# Patient Record
Sex: MALE | Race: WHITE | NOT HISPANIC OR LATINO | URBAN - METROPOLITAN AREA
[De-identification: names, ages, dates, MRNs, and addresses within clinical notes are randomized per-mention and may not be internally consistent; named-entity substitution may affect disease eponyms.]

---

## 2018-09-17 ENCOUNTER — INPATIENT (INPATIENT)
Facility: HOSPITAL | Age: 68
LOS: 0 days | Discharge: LEFT BEFORE DISCHARGE | DRG: 445 | End: 2018-09-18
Attending: STUDENT IN AN ORGANIZED HEALTH CARE EDUCATION/TRAINING PROGRAM | Admitting: STUDENT IN AN ORGANIZED HEALTH CARE EDUCATION/TRAINING PROGRAM
Payer: MEDICARE

## 2018-09-17 VITALS
WEIGHT: 178.13 LBS | TEMPERATURE: 100 F | OXYGEN SATURATION: 98 % | RESPIRATION RATE: 18 BRPM | SYSTOLIC BLOOD PRESSURE: 118 MMHG | HEART RATE: 98 BPM | HEIGHT: 69 IN | DIASTOLIC BLOOD PRESSURE: 67 MMHG

## 2018-09-17 DIAGNOSIS — R50.9 FEVER, UNSPECIFIED: ICD-10-CM

## 2018-09-17 DIAGNOSIS — Z90.49 ACQUIRED ABSENCE OF OTHER SPECIFIED PARTS OF DIGESTIVE TRACT: Chronic | ICD-10-CM

## 2018-09-17 DIAGNOSIS — Z96.651 PRESENCE OF RIGHT ARTIFICIAL KNEE JOINT: Chronic | ICD-10-CM

## 2018-09-17 DIAGNOSIS — K83.0 CHOLANGITIS: ICD-10-CM

## 2018-09-17 DIAGNOSIS — Z98.890 OTHER SPECIFIED POSTPROCEDURAL STATES: Chronic | ICD-10-CM

## 2018-09-17 DIAGNOSIS — Z91.89 OTHER SPECIFIED PERSONAL RISK FACTORS, NOT ELSEWHERE CLASSIFIED: ICD-10-CM

## 2018-09-17 DIAGNOSIS — E87.1 HYPO-OSMOLALITY AND HYPONATREMIA: ICD-10-CM

## 2018-09-17 DIAGNOSIS — I10 ESSENTIAL (PRIMARY) HYPERTENSION: ICD-10-CM

## 2018-09-17 DIAGNOSIS — N17.9 ACUTE KIDNEY FAILURE, UNSPECIFIED: ICD-10-CM

## 2018-09-17 DIAGNOSIS — E78.2 MIXED HYPERLIPIDEMIA: ICD-10-CM

## 2018-09-17 LAB
ALBUMIN SERPL ELPH-MCNC: 4.2 G/DL — SIGNIFICANT CHANGE UP (ref 3.3–5)
ALP SERPL-CCNC: 79 U/L — SIGNIFICANT CHANGE UP (ref 40–120)
ALT FLD-CCNC: 63 U/L — HIGH (ref 10–45)
ANION GAP SERPL CALC-SCNC: 11 MMOL/L — SIGNIFICANT CHANGE UP (ref 5–17)
APPEARANCE UR: CLEAR — SIGNIFICANT CHANGE UP
APTT BLD: 28.5 SEC — SIGNIFICANT CHANGE UP (ref 27.5–37.4)
AST SERPL-CCNC: 53 U/L — HIGH (ref 10–40)
BILIRUB SERPL-MCNC: 0.6 MG/DL — SIGNIFICANT CHANGE UP (ref 0.2–1.2)
BILIRUB UR-MCNC: NEGATIVE — SIGNIFICANT CHANGE UP
BLD GP AB SCN SERPL QL: NEGATIVE — SIGNIFICANT CHANGE UP
BUN SERPL-MCNC: 17 MG/DL — SIGNIFICANT CHANGE UP (ref 7–23)
CALCIUM SERPL-MCNC: 9.5 MG/DL — SIGNIFICANT CHANGE UP (ref 8.4–10.5)
CHLORIDE SERPL-SCNC: 94 MMOL/L — LOW (ref 96–108)
CHLORIDE UR-SCNC: 34 MMOL/L — SIGNIFICANT CHANGE UP
CO2 SERPL-SCNC: 27 MMOL/L — SIGNIFICANT CHANGE UP (ref 22–31)
COLOR SPEC: YELLOW — SIGNIFICANT CHANGE UP
CREAT ?TM UR-MCNC: 44 MG/DL — SIGNIFICANT CHANGE UP
CREAT SERPL-MCNC: 1.31 MG/DL — HIGH (ref 0.5–1.3)
DIFF PNL FLD: NEGATIVE — SIGNIFICANT CHANGE UP
EOSINOPHIL NFR BLD AUTO: 1 % — SIGNIFICANT CHANGE UP (ref 0–6)
GLUCOSE SERPL-MCNC: 110 MG/DL — HIGH (ref 70–99)
GLUCOSE UR QL: NEGATIVE — SIGNIFICANT CHANGE UP
HCT VFR BLD CALC: 44.7 % — SIGNIFICANT CHANGE UP (ref 39–50)
HGB BLD-MCNC: 14.5 G/DL — SIGNIFICANT CHANGE UP (ref 13–17)
INR BLD: 1.15 — SIGNIFICANT CHANGE UP (ref 0.88–1.16)
KETONES UR-MCNC: NEGATIVE — SIGNIFICANT CHANGE UP
LACTATE SERPL-SCNC: 0.9 MMOL/L — SIGNIFICANT CHANGE UP (ref 0.5–2)
LACTATE SERPL-SCNC: 2.6 MMOL/L — HIGH (ref 0.5–2)
LEUKOCYTE ESTERASE UR-ACNC: NEGATIVE — SIGNIFICANT CHANGE UP
LYMPHOCYTES # BLD AUTO: 5 % — LOW (ref 13–44)
MCHC RBC-ENTMCNC: 29.2 PG — SIGNIFICANT CHANGE UP (ref 27–34)
MCHC RBC-ENTMCNC: 32.4 G/DL — SIGNIFICANT CHANGE UP (ref 32–36)
MCV RBC AUTO: 90.1 FL — SIGNIFICANT CHANGE UP (ref 80–100)
MONOCYTES NFR BLD AUTO: 10 % — SIGNIFICANT CHANGE UP (ref 2–14)
NEUTROPHILS NFR BLD AUTO: 83 % — HIGH (ref 43–77)
NITRITE UR-MCNC: NEGATIVE — SIGNIFICANT CHANGE UP
OSMOLALITY UR: 240 MOSMOL/KG — SIGNIFICANT CHANGE UP (ref 100–650)
PH UR: 5.5 — SIGNIFICANT CHANGE UP (ref 5–8)
PLATELET # BLD AUTO: 197 K/UL — SIGNIFICANT CHANGE UP (ref 150–400)
POTASSIUM SERPL-MCNC: 4.6 MMOL/L — SIGNIFICANT CHANGE UP (ref 3.5–5.3)
POTASSIUM SERPL-SCNC: 4.6 MMOL/L — SIGNIFICANT CHANGE UP (ref 3.5–5.3)
POTASSIUM UR-SCNC: 30 MMOL/L — SIGNIFICANT CHANGE UP
PROT ?TM UR-MCNC: 5 MG/DL — SIGNIFICANT CHANGE UP (ref 0–12)
PROT SERPL-MCNC: 7.4 G/DL — SIGNIFICANT CHANGE UP (ref 6–8.3)
PROT UR-MCNC: NEGATIVE MG/DL — SIGNIFICANT CHANGE UP
PROTHROM AB SERPL-ACNC: 12.8 SEC — HIGH (ref 9.8–12.7)
RAPID RVP RESULT: SIGNIFICANT CHANGE UP
RBC # BLD: 4.96 M/UL — SIGNIFICANT CHANGE UP (ref 4.2–5.8)
RBC # FLD: 13.1 % — SIGNIFICANT CHANGE UP (ref 10.3–16.9)
RH IG SCN BLD-IMP: POSITIVE — SIGNIFICANT CHANGE UP
SODIUM SERPL-SCNC: 132 MMOL/L — LOW (ref 135–145)
SODIUM UR-SCNC: 23 MMOL/L — SIGNIFICANT CHANGE UP
SP GR SPEC: 1.01 — SIGNIFICANT CHANGE UP (ref 1–1.03)
UROBILINOGEN FLD QL: 0.2 E.U./DL — SIGNIFICANT CHANGE UP
WBC # BLD: 7 K/UL — SIGNIFICANT CHANGE UP (ref 3.8–10.5)
WBC # FLD AUTO: 7 K/UL — SIGNIFICANT CHANGE UP (ref 3.8–10.5)

## 2018-09-17 PROCEDURE — 99223 1ST HOSP IP/OBS HIGH 75: CPT | Mod: GC,AI

## 2018-09-17 PROCEDURE — 99285 EMERGENCY DEPT VISIT HI MDM: CPT

## 2018-09-17 PROCEDURE — 71046 X-RAY EXAM CHEST 2 VIEWS: CPT | Mod: 26

## 2018-09-17 RX ORDER — PIPERACILLIN AND TAZOBACTAM 4; .5 G/20ML; G/20ML
3.38 INJECTION, POWDER, LYOPHILIZED, FOR SOLUTION INTRAVENOUS ONCE
Qty: 0 | Refills: 0 | Status: COMPLETED | OUTPATIENT
Start: 2018-09-17 | End: 2018-09-17

## 2018-09-17 RX ORDER — SODIUM CHLORIDE 9 MG/ML
1000 INJECTION INTRAMUSCULAR; INTRAVENOUS; SUBCUTANEOUS ONCE
Qty: 0 | Refills: 0 | Status: COMPLETED | OUTPATIENT
Start: 2018-09-17 | End: 2018-09-17

## 2018-09-17 RX ORDER — LISINOPRIL 2.5 MG/1
1 TABLET ORAL
Qty: 0 | Refills: 0 | COMMUNITY

## 2018-09-17 RX ORDER — TEMAZEPAM 15 MG/1
30 CAPSULE ORAL AT BEDTIME
Qty: 0 | Refills: 0 | Status: DISCONTINUED | OUTPATIENT
Start: 2018-09-17 | End: 2018-09-18

## 2018-09-17 RX ORDER — SODIUM CHLORIDE 9 MG/ML
1000 INJECTION INTRAMUSCULAR; INTRAVENOUS; SUBCUTANEOUS
Qty: 0 | Refills: 0 | Status: DISCONTINUED | OUTPATIENT
Start: 2018-09-17 | End: 2018-09-18

## 2018-09-17 RX ORDER — LISINOPRIL 2.5 MG/1
0 TABLET ORAL
Qty: 0 | Refills: 0 | COMMUNITY

## 2018-09-17 RX ORDER — SODIUM CHLORIDE 9 MG/ML
500 INJECTION INTRAMUSCULAR; INTRAVENOUS; SUBCUTANEOUS ONCE
Qty: 0 | Refills: 0 | Status: COMPLETED | OUTPATIENT
Start: 2018-09-17 | End: 2018-09-17

## 2018-09-17 RX ORDER — IBUPROFEN 200 MG
600 TABLET ORAL ONCE
Qty: 0 | Refills: 0 | Status: COMPLETED | OUTPATIENT
Start: 2018-09-17 | End: 2018-09-17

## 2018-09-17 RX ORDER — ATORVASTATIN CALCIUM 80 MG/1
40 TABLET, FILM COATED ORAL AT BEDTIME
Qty: 0 | Refills: 0 | Status: DISCONTINUED | OUTPATIENT
Start: 2018-09-17 | End: 2018-09-17

## 2018-09-17 RX ORDER — HEPARIN SODIUM 5000 [USP'U]/ML
5000 INJECTION INTRAVENOUS; SUBCUTANEOUS EVERY 8 HOURS
Qty: 0 | Refills: 0 | Status: DISCONTINUED | OUTPATIENT
Start: 2018-09-17 | End: 2018-09-18

## 2018-09-17 RX ORDER — VANCOMYCIN HCL 1 G
1000 VIAL (EA) INTRAVENOUS ONCE
Qty: 0 | Refills: 0 | Status: COMPLETED | OUTPATIENT
Start: 2018-09-17 | End: 2018-09-17

## 2018-09-17 RX ORDER — ACETAMINOPHEN 500 MG
650 TABLET ORAL ONCE
Qty: 0 | Refills: 0 | Status: COMPLETED | OUTPATIENT
Start: 2018-09-17 | End: 2018-09-17

## 2018-09-17 RX ORDER — ATORVASTATIN CALCIUM 80 MG/1
1 TABLET, FILM COATED ORAL
Qty: 0 | Refills: 0 | COMMUNITY

## 2018-09-17 RX ORDER — ROSUVASTATIN CALCIUM 5 MG/1
1 TABLET ORAL
Qty: 0 | Refills: 0 | COMMUNITY

## 2018-09-17 RX ORDER — ATORVASTATIN CALCIUM 80 MG/1
80 TABLET, FILM COATED ORAL AT BEDTIME
Qty: 0 | Refills: 0 | Status: DISCONTINUED | OUTPATIENT
Start: 2018-09-17 | End: 2018-09-18

## 2018-09-17 RX ORDER — ATORVASTATIN CALCIUM 80 MG/1
0 TABLET, FILM COATED ORAL
Qty: 0 | Refills: 0 | COMMUNITY

## 2018-09-17 RX ADMIN — Medication 250 MILLIGRAM(S): at 14:32

## 2018-09-17 RX ADMIN — Medication 600 MILLIGRAM(S): at 14:32

## 2018-09-17 RX ADMIN — SODIUM CHLORIDE 2000 MILLILITER(S): 9 INJECTION INTRAMUSCULAR; INTRAVENOUS; SUBCUTANEOUS at 14:32

## 2018-09-17 RX ADMIN — Medication 600 MILLIGRAM(S): at 13:15

## 2018-09-17 RX ADMIN — ATORVASTATIN CALCIUM 80 MILLIGRAM(S): 80 TABLET, FILM COATED ORAL at 22:09

## 2018-09-17 RX ADMIN — HEPARIN SODIUM 5000 UNIT(S): 5000 INJECTION INTRAVENOUS; SUBCUTANEOUS at 22:09

## 2018-09-17 RX ADMIN — PIPERACILLIN AND TAZOBACTAM 200 GRAM(S): 4; .5 INJECTION, POWDER, LYOPHILIZED, FOR SOLUTION INTRAVENOUS at 13:56

## 2018-09-17 RX ADMIN — Medication 650 MILLIGRAM(S): at 22:07

## 2018-09-17 RX ADMIN — SODIUM CHLORIDE 100 MILLILITER(S): 9 INJECTION INTRAMUSCULAR; INTRAVENOUS; SUBCUTANEOUS at 17:46

## 2018-09-17 RX ADMIN — SODIUM CHLORIDE 1000 MILLILITER(S): 9 INJECTION INTRAMUSCULAR; INTRAVENOUS; SUBCUTANEOUS at 14:32

## 2018-09-17 RX ADMIN — TEMAZEPAM 30 MILLIGRAM(S): 15 CAPSULE ORAL at 23:32

## 2018-09-17 RX ADMIN — SODIUM CHLORIDE 2000 MILLILITER(S): 9 INJECTION INTRAMUSCULAR; INTRAVENOUS; SUBCUTANEOUS at 13:15

## 2018-09-17 RX ADMIN — Medication 650 MILLIGRAM(S): at 23:10

## 2018-09-17 RX ADMIN — PIPERACILLIN AND TAZOBACTAM 3.38 GRAM(S): 4; .5 INJECTION, POWDER, LYOPHILIZED, FOR SOLUTION INTRAVENOUS at 14:32

## 2018-09-17 NOTE — ED PROVIDER NOTE - MEDICAL DECISION MAKING DETAILS
69 yo M with pmh of bile duct stenosis s/p 3 procedures, sepsis x 3 (last was septic 1 year ago admitted to ICU with no source) c/o fever x 2 hours. mild dry cough but no other symptoms. Took tylenol and cipro around 11:15. NO sick contacts. Travelling from [place]. T 99.8, HR 98, /67. Well appearing. Pharynx clear, lungs cta b/l, abd soft,nt, nd. No rash.

## 2018-09-17 NOTE — ED PROVIDER NOTE - OBJECTIVE STATEMENT
67 yo M with pmh of bile duct stenosis s/p 3 procedures, sepsis x 3 (last was septic 1 year ago admitted to ICU with no source) c/o fever x 2 hours. Pt states he felt a fever coming on so he took his temperature and it was 101 around 11:00am. Pt is travelling from [place] and was told by his ID doctor if he ever has a fever to take 2 extra strength tylenol and 2 cipros and go to the ER. Pt reports a very mild dry intermittent cough for the past 2 days but states he only coughs every once in a while. Pt states his first 2 episodes of sepsis were following the bile duct procedure but last year he was septic and hypotensive without a source. At that time his only symptom was fever and bodyaches. Today denies chills, bodyaches, sore throat, cp, sob, abd pain, n/v, back pain, dysuria, rash. 67 yo M with pmh of primary sclerosing cholangitis s/p 3 procedures, sepsis x 3 (last was septic 1 year ago admitted to ICU with no source) c/o fever x 2 hours. Pt states he felt a fever coming on so he took his temperature and it was 101 around 11:00am. Pt is travelling from [place] and was told by his ID doctor if he ever has a fever to take 2 extra strength tylenol and 2 cipros and go to the ER. Pt reports a very mild dry intermittent cough for the past 2 days but states he only coughs every once in a while. Pt states his first 2 episodes of sepsis were following the bile duct procedure but last year he was septic and hypotensive without a source. At that time his only symptom was fever and bodyaches. Today denies chills, bodyaches, sore throat, cp, sob, abd pain, n/v, back pain, dysuria, rash.

## 2018-09-17 NOTE — H&P ADULT - PROBLEM SELECTOR PLAN 2
-patient has hx w/ ERCP post dilatation.  -will keep patient NPO for MRCP, if areas show stenosis, will take patient to ERCP  -bilirubin 0.6 AST ALT mildly elevated.  -appreciate GI recommendations

## 2018-09-17 NOTE — H&P ADULT - PSH
History of cholecystectomy    History of ERCP  w/ dilatation  History of total right knee replacement

## 2018-09-17 NOTE — H&P ADULT - PROBLEM SELECTOR PLAN 1
-patient had self reported fever of 101 F.  -has hx of sepsis, last year enterococcus in cx but unable to find source  -post vanc 1,000 mg/zosyn 3.375 mg in ED, continue for now  -f/u blood and urine culture. RVP negative  -initial lactate of 2.6, repeat at 4 pm post 2.5 L NS. possibly 2/2 dehydration  -normal wbc -patient had self reported fever of 101 F.  -has hx of sepsis, last year enterococcus in cx but unable to find source  -post vanc 1,000 mg/zosyn 3.375 mg in ED, continue for now  -f/u blood and urine culture. RVP negative  -initial lactate of 2.6, repeat at 4 pm post 2.5 L NS. possibly 2/2 dehydration  -normal wbc  -zosyn 3.375 Q6 and vanc 1,000 mg Q12 until culture f/u -patient had self reported fever of 101 F.  -has hx of sepsis, last year enterococcus in cx but unable to find source  -post vanc 1,000 mg/zosyn 3.375 mg in ED, continue for now  -SIRS +1/4 agents only (fever 101)  -f/u blood and urine culture. RVP negative  -initial lactate of 2.6, repeat at 4 pm post 2.5 L NS. possibly 2/2 dehydration  -normal wbc  -zosyn 3.375 Q6 and vanc 1,000 mg Q12 until culture f/u -patient had self reported fever of 101 F.  -has hx of sepsis, last year enterococcus in cx but unable to find source  -post vanc 1,000 mg/zosyn 3.375 mg in ED, continue for now  -SIRS +1/4 agents only (fever 101)  -f/u blood, u/a, and urine culture. RVP negative  -initial lactate of 2.6, repeat at 4 pm post 2.5 L NS. possibly 2/2 dehydration  -normal wbc  -will hold on vanc/zosyn for now. Will monitor for 24 hours, if spikes fever restart vanc/zosyn -patient had self reported fever of 101 F.  -has hx of sepsis, last year enterococcus in cx but unable to find source  -post vanc 1,000 mg/zosyn 3.375 mg in ED, continue for now  -SIRS +1/4 agents only (fever 101)  -f/u blood, u/a, and urine culture. RVP negative  -initial lactate of 2.6, repeat at 4 pm post 2.5 L NS. possibly 2/2 dehydration  -normal wbc  -will hold off on abx for now. If pt spikes fever, restart vanc/zosyn

## 2018-09-17 NOTE — H&P ADULT - ATTENDING COMMENTS
Pt seen and examined by me at bedside earlier in AM. Agree with housestaff's exam/a/p as noted above with additions,   reported fever of 101 this AM at the hotel, no focal complaints/sxs.   took cipro x2 prior to admission and tylenol, s/p vanco/zosyn in ED.   Fever unclear source-Appreciate GI recs, ok to hold off abx for now, no reported fever/no wbc but with L shift. f/u bcx, if reported fever, restart abx.   obtain collateral information from pt's PMD and ID in AM  rest a/p as above.

## 2018-09-17 NOTE — H&P ADULT - NSHPOUTPATIENTPROVIDERS_GEN_ALL_CORE
PCP Dr. Poncho Da Silva ([facility]), GI Dr. Kelvin Chairez ([facility])

## 2018-09-17 NOTE — ED ADULT NURSE NOTE - NSIMPLEMENTINTERV_GEN_ALL_ED
Implemented All Universal Safety Interventions:  [place] to call system. Call bell, personal items and telephone within reach. Instruct patient to call for assistance. Room bathroom lighting operational. Non-slip footwear when patient is off stretcher. Physically safe environment: no spills, clutter or unnecessary equipment. Stretcher in lowest position, wheels locked, appropriate side rails in place.

## 2018-09-17 NOTE — H&P ADULT - NSHPPHYSICALEXAM_GEN_ALL_CORE
PHYSICAL EXAM:  Constitutional: patient is alert and oriented times 3  Eyes: no scleral icterus. EOMI. PERRLA present  ENMT: hearing grossly intact b/l. no pharyngeal erythema.  uvula midline  Neck: neck supple, no jvd, no lymphadenopathy palpated  Back: no signs of trauma or ecchymosis  Respiratory: CTAB no wheezes, rhonchi, or crackles  Cardiovascular: RRR normal S1 S2.  Gastrointestinal: soft NDNT. BS present all 4 quadrants  Extremities: well perfused extremities, no edema noted  Vascular: 2+ radial pulses b/l  Neurological: alert and oriented times 3  5/5 upper and lower extremity strength  Psychiatric: appropriate mood and affect

## 2018-09-17 NOTE — ED PROVIDER NOTE - PROGRESS NOTE DETAILS
spoke with pts doctor Dr. Duarte [phone] who states pt hasa hx of primary sclerosing cholangitis and has been septic 3 times. Pt usually goes downhill fast. Last time pt was admitted to ICU and his blood cultures grew out enterococcus. He recommends IV zosyn and GI consult and to be admitted

## 2018-09-17 NOTE — H&P ADULT - NSHPLABSRESULTS_GEN_ALL_CORE
.  LABS:                         14.5   7.0   )-----------( 197      ( 17 Sep 2018 13:14 )             44.7     09-17    132<L>  |  94<L>  |  17  ----------------------------<  110<H>  4.6   |  27  |  1.31<H>    Ca    9.5      17 Sep 2018 13:14    TPro  7.4  /  Alb  4.2  /  TBili  0.6  /  DBili  x   /  AST  53<H>  /  ALT  63<H>  /  AlkPhos  79  09-17      Lactate, Blood: 2.6 mmoL/L (09-17 @ 13:14)

## 2018-09-17 NOTE — ED PROVIDER NOTE - ATTENDING CONTRIBUTION TO CARE
69 yo M with pmh of primary sclerosing cholangitis s/p 3 procedures, sepsis x 3 (last was septic 1 year ago admitted to ICU with no source) c/o fever x 2 hours. Pt states he felt a fever coming on so he took his temperature and it was 101 around 11:00am. Pt is travelling from [place] and was told by his ID doctor if he ever has a fever to take 2 extra strength tylenol and 2 cipros and go to the ER. Pt reports a very mild dry intermittent cough for the past 2 days but states he only coughs every once in a while. Pt states his first 2 episodes of sepsis were following the bile duct procedure but last year he was septic and hypotensive without a source. At that time his only symptom was fever and bodyaches. Today denies chills, bodyaches, sore throat, cp, sob, abd pain, n/v, back pain, dysuria, rash.    Pt well appearing, non-toxic    PE no gross focal findings.  lungs clear  abd soft, nt  neuro intact    PA discussed with PMD in [place] who states pt has been septic multiple times from cholangitis and has quick decline.  Recommends admission for IV antibiotics and further mgmt.    PT with [place] 2.6 and soft bp in ED in 100s.  Resuscitated with fluids and received antibiotics.

## 2018-09-17 NOTE — CONSULT NOTE ADULT - SUBJECTIVE AND OBJECTIVE BOX
HPI: 68M with pmhx of PSC s/p ERCP w/ stricture dilation presents to [facility] ER after episode of fever this am. Pt is visiting from [place] and felt feverish this am (reports temp of 101 orally). He denies abd pain n/v, chills, fatigue (reports during his previous episode      PAST MEDICAL & SURGICAL HISTORY:  Sepsis: x3  History of cholecystectomy      MEDICATIONS  (STANDING):  sodium chloride 0.9% Bolus 1000 milliLiter(s) IV Bolus once  sodium chloride 0.9% Bolus 500 milliLiter(s) IV Bolus once  vancomycin  IVPB 1000 milliGRAM(s) IV Intermittent once    MEDICATIONS  (PRN):      Allergies    No Known Allergies    Intolerances        SOCIAL HISTORY:    FAMILY HISTORY:      Vital Signs Last 24 Hrs  T(C): 37 (17 Sep 2018 13:03), Max: 37.7 (17 Sep 2018 11:56)  T(F): 98.6 (17 Sep 2018 13:03), Max: 99.8 (17 Sep 2018 11:56)  HR: 80 (17 Sep 2018 13:03) (80 - 98)  BP: 100/63 (17 Sep 2018 13:03) (100/63 - 118/67)  BP(mean): --  RR: 18 (17 Sep 2018 13:03) (18 - 18)  SpO2: 96% (17 Sep 2018 13:03) (96% - 98%)    PHYSICAL EXAM:    GEN: well appearing, NAD, AOX3, conversant  HEENT: anicteric  CHEST: equal chest rise  CVS: RRR  ABD: soft, nt, nd   EXT: no edema         LABS:                        14.5   7.0   )-----------( 197      ( 17 Sep 2018 13:14 )             44.7     09-17    132<L>  |  94<L>  |  17  ----------------------------<  110<H>  4.6   |  27  |  1.31<H>    Ca    9.5      17 Sep 2018 13:14    TPro  7.4  /  Alb  4.2  /  TBili  0.6  /  DBili  x   /  AST  53<H>  /  ALT  63<H>  /  AlkPhos  79  09-17          RADIOLOGY & ADDITIONAL STUDIES:

## 2018-09-17 NOTE — CONSULT NOTE ADULT - ASSESSMENT
68 year old male with PSC requiring past ERCPs for stricture dilation presents to ER after episode of fever concerning for cholangitis. While pt does not have biochemical evidence of infection (elevated WBC, normal LTs) given complicated history would complete infectious work up and obtain further imaging to r/o repeat stricture.     #PSC w/ concerning for cholangitis  -BCx, UCx, CXR  -Monitor LT's   -MRCP to r/o recurrence of stricture  -ERCP pending imaging studies  -Can continue abx     GI will follow.       case d/w Dr. Campos

## 2018-09-17 NOTE — H&P ADULT - ASSESSMENT
Patient is a 69 y/o m w/ pmhx of PSC (s/p 3 ERCP w/ dilatation), cholecystectomy, 3 episodes of sepsis (last blood cx in 2017 and ICU admission w/ enterococcus), htn, and hld who presented to the ED due to a fever at home of 101.

## 2018-09-17 NOTE — H&P ADULT - PROBLEM SELECTOR PLAN 3
-likely 2/2 dehydration  -Patient had Creatinine of 1.31, baseline is 1.07.  -does not have history of kidney disease  -urine lytes  -will follow BMP in AM after 2.5 L fluids given in ED today.

## 2018-09-17 NOTE — H&P ADULT - PROBLEM SELECTOR PLAN 7
1) PCP Contacted on Admission: (Y/N) --> Name & Phone #: Dr. Poncho Da Silva was contacted. Baseline creatinine 1.07 but now 1.31. Physician agrees with current management.  2) Date of Contact with PCP:  3) PCP Contacted at Discharge: (Y/N, N/A)  4) Summary of Handoff Given to PCP:   5) Post-Discharge Appointment Date and Location:    [facility]  Diet: NPO due to procedure, will change to low salt DASH diet  Electrolytes: K>4 Mag >2  Prophylaxis: Heparin subQ 5,000 Q8 hours.  code status: FULL CODE 1) PCP Contacted on Admission: (Y/N) --> Name & Phone #: Dr. Poncho Da Silva was contacted. Baseline creatinine 1.07 but now 1.31. Physician agrees with current management.  2) Date of Contact with PCP:  3) PCP Contacted at Discharge: (Y/N, N/A)  4) Summary of Handoff Given to PCP:   5) Post-Discharge Appointment Date and Location:    [facility]  Diet: NPO due to procedure, will change to low salt DASH diet. 100 cc/hr maintenance fluids for 10 hours.  Electrolytes: K>4 Mag >2  Prophylaxis: Heparin subQ 5,000 Q8 hours.  code status: FULL CODE

## 2018-09-17 NOTE — H&P ADULT - PROBLEM SELECTOR PLAN 6
-patient on lisinopril 20 mg at bedtime, can hold for now.  -/82, can hold for now due to SEAN. -patient on lisinopril/hct 10-12.5 mg at bedtime, can hold for now.  -/82, can hold for now due to SEAN.

## 2018-09-17 NOTE — H&P ADULT - HISTORY OF PRESENT ILLNESS
Patient is a 67 y/o m w/ pmhx of PSC (s/p 3 ERCP w/ dilatation), cholecystectomy, 3 episodes of sepsis (last blood cx in 2017 and ICU admission w/ enterococcus), htn, and hld who presented to the ED due to a fever at home of 101. His ID physician told him to take 2 extra strength tylenol, and 2 ciprofloxacin (500 mg each) when this happens and go to the emergency room. He states the fever has only been present at 11 am today. He lives in [place] and is traveling here. His partner noted a dry cough for the past 2 days, but he has not. He denies nausea, vomiting, diarrhea, sob, cp, dysuria, or lightheadedness. He has had a colonoscopy/endoscopy in the past few years and was told those were unremarkable. Denies sick contacts.    ED: vitals 99.8 oral (after motrin 400 mg it was 98.6), 98 heart rate (80 after fluids), 118/67 bp, and 18 breathes per minute with 98% saturation room air. He received motrin 400 mg, vancomycin 1,000 mg, and zosyn 3.375 mg. 2.5 L NS

## 2018-09-17 NOTE — H&P ADULT - PROBLEM SELECTOR PLAN 5
-atorvastatin 40 mg at bedtime.  -can continue with it. -takes crestor 40 mg at bedtime  -atorvastatin 80 mg at bedtime therapeutic exchange  -can continue with it.

## 2018-09-17 NOTE — ED PROVIDER NOTE - PHYSICAL EXAMINATION
CONSTITUTIONAL: Well-appearing; well-nourished; in no apparent distress.   HEAD: Normocephalic; atraumatic.   EYES: PERRL; EOM intact; conjunctiva and sclera clear  ENT: normal nose; no rhinorrhea; normal pharynx with no erythema or lesions.   NECK: Supple; non-tender; no LAD  CARDIOVASCULAR: Normal S1, S2; no murmurs, rubs, or gallops. Regular rate and rhythm.   RESPIRATORY: Breathing easily; breath sounds clear and equal bilaterally; no wheezes, rhonchi, or rales.  GI: Soft; non-distended; non-tender; no palpable organomegaly.   MSK: FROM at all extremities, normal tone   EXT: No cyanosis or edema; N/V intact  SKIN: warm, back with sweat  NEURO: A & O x 3; face symmetric; grossly unremarkable.   PSYCHOLOGICAL: The patient’s mood and manner are appropriate.

## 2018-09-18 VITALS
DIASTOLIC BLOOD PRESSURE: 85 MMHG | TEMPERATURE: 98 F | SYSTOLIC BLOOD PRESSURE: 155 MMHG | OXYGEN SATURATION: 99 % | RESPIRATION RATE: 18 BRPM | HEART RATE: 68 BPM

## 2018-09-18 LAB
ALBUMIN SERPL ELPH-MCNC: 3.3 G/DL — SIGNIFICANT CHANGE UP (ref 3.3–5)
ALP SERPL-CCNC: 72 U/L — SIGNIFICANT CHANGE UP (ref 40–120)
ALT FLD-CCNC: 46 U/L — HIGH (ref 10–45)
ANION GAP SERPL CALC-SCNC: 11 MMOL/L — SIGNIFICANT CHANGE UP (ref 5–17)
AST SERPL-CCNC: 36 U/L — SIGNIFICANT CHANGE UP (ref 10–40)
BASOPHILS NFR BLD AUTO: 0.2 % — SIGNIFICANT CHANGE UP (ref 0–2)
BILIRUB DIRECT SERPL-MCNC: <0.2 MG/DL — SIGNIFICANT CHANGE UP (ref 0–0.2)
BILIRUB INDIRECT FLD-MCNC: >0.2 MG/DL — SIGNIFICANT CHANGE UP (ref 0.2–1)
BILIRUB SERPL-MCNC: 0.4 MG/DL — SIGNIFICANT CHANGE UP (ref 0.2–1.2)
BILIRUB SERPL-MCNC: 0.4 MG/DL — SIGNIFICANT CHANGE UP (ref 0.2–1.2)
BUN SERPL-MCNC: 11 MG/DL — SIGNIFICANT CHANGE UP (ref 7–23)
CALCIUM SERPL-MCNC: 8.8 MG/DL — SIGNIFICANT CHANGE UP (ref 8.4–10.5)
CHLORIDE SERPL-SCNC: 103 MMOL/L — SIGNIFICANT CHANGE UP (ref 96–108)
CO2 SERPL-SCNC: 26 MMOL/L — SIGNIFICANT CHANGE UP (ref 22–31)
CREAT SERPL-MCNC: 1.14 MG/DL — SIGNIFICANT CHANGE UP (ref 0.5–1.3)
EOSINOPHIL NFR BLD AUTO: 0.7 % — SIGNIFICANT CHANGE UP (ref 0–6)
GLUCOSE BLDC GLUCOMTR-MCNC: 87 MG/DL — SIGNIFICANT CHANGE UP (ref 70–99)
GLUCOSE SERPL-MCNC: 93 MG/DL — SIGNIFICANT CHANGE UP (ref 70–99)
HCT VFR BLD CALC: 39.2 % — SIGNIFICANT CHANGE UP (ref 39–50)
HGB BLD-MCNC: 12.7 G/DL — LOW (ref 13–17)
LYMPHOCYTES # BLD AUTO: 19.4 % — SIGNIFICANT CHANGE UP (ref 13–44)
MAGNESIUM SERPL-MCNC: 1.9 MG/DL — SIGNIFICANT CHANGE UP (ref 1.6–2.6)
MCHC RBC-ENTMCNC: 29.3 PG — SIGNIFICANT CHANGE UP (ref 27–34)
MCHC RBC-ENTMCNC: 32.4 G/DL — SIGNIFICANT CHANGE UP (ref 32–36)
MCV RBC AUTO: 90.3 FL — SIGNIFICANT CHANGE UP (ref 80–100)
MONOCYTES NFR BLD AUTO: 20.4 % — HIGH (ref 2–14)
NEUTROPHILS NFR BLD AUTO: 59.3 % — SIGNIFICANT CHANGE UP (ref 43–77)
PLATELET # BLD AUTO: 183 K/UL — SIGNIFICANT CHANGE UP (ref 150–400)
POTASSIUM SERPL-MCNC: 4.2 MMOL/L — SIGNIFICANT CHANGE UP (ref 3.5–5.3)
POTASSIUM SERPL-SCNC: 4.2 MMOL/L — SIGNIFICANT CHANGE UP (ref 3.5–5.3)
PROT SERPL-MCNC: 5.8 G/DL — LOW (ref 6–8.3)
RBC # BLD: 4.34 M/UL — SIGNIFICANT CHANGE UP (ref 4.2–5.8)
RBC # FLD: 13 % — SIGNIFICANT CHANGE UP (ref 10.3–16.9)
SODIUM SERPL-SCNC: 140 MMOL/L — SIGNIFICANT CHANGE UP (ref 135–145)
WBC # BLD: 5.4 K/UL — SIGNIFICANT CHANGE UP (ref 3.8–10.5)
WBC # FLD AUTO: 5.4 K/UL — SIGNIFICANT CHANGE UP (ref 3.8–10.5)

## 2018-09-18 PROCEDURE — 99233 SBSQ HOSP IP/OBS HIGH 50: CPT | Mod: GC

## 2018-09-18 PROCEDURE — 84156 ASSAY OF PROTEIN URINE: CPT

## 2018-09-18 PROCEDURE — 36415 COLL VENOUS BLD VENIPUNCTURE: CPT

## 2018-09-18 PROCEDURE — 99285 EMERGENCY DEPT VISIT HI MDM: CPT | Mod: 25

## 2018-09-18 PROCEDURE — 84300 ASSAY OF URINE SODIUM: CPT

## 2018-09-18 PROCEDURE — 82570 ASSAY OF URINE CREATININE: CPT

## 2018-09-18 PROCEDURE — 83605 ASSAY OF LACTIC ACID: CPT

## 2018-09-18 PROCEDURE — 96375 TX/PRO/DX INJ NEW DRUG ADDON: CPT | Mod: XU

## 2018-09-18 PROCEDURE — 86850 RBC ANTIBODY SCREEN: CPT

## 2018-09-18 PROCEDURE — 87581 M.PNEUMON DNA AMP PROBE: CPT

## 2018-09-18 PROCEDURE — 87798 DETECT AGENT NOS DNA AMP: CPT

## 2018-09-18 PROCEDURE — 81003 URINALYSIS AUTO W/O SCOPE: CPT

## 2018-09-18 PROCEDURE — 85025 COMPLETE CBC W/AUTO DIFF WBC: CPT

## 2018-09-18 PROCEDURE — 87633 RESP VIRUS 12-25 TARGETS: CPT

## 2018-09-18 PROCEDURE — 82962 GLUCOSE BLOOD TEST: CPT

## 2018-09-18 PROCEDURE — 71046 X-RAY EXAM CHEST 2 VIEWS: CPT

## 2018-09-18 PROCEDURE — 83735 ASSAY OF MAGNESIUM: CPT

## 2018-09-18 PROCEDURE — 87040 BLOOD CULTURE FOR BACTERIA: CPT

## 2018-09-18 PROCEDURE — 82248 BILIRUBIN DIRECT: CPT

## 2018-09-18 PROCEDURE — 85610 PROTHROMBIN TIME: CPT

## 2018-09-18 PROCEDURE — 82247 BILIRUBIN TOTAL: CPT

## 2018-09-18 PROCEDURE — 96361 HYDRATE IV INFUSION ADD-ON: CPT

## 2018-09-18 PROCEDURE — 87486 CHLMYD PNEUM DNA AMP PROBE: CPT

## 2018-09-18 PROCEDURE — A9585: CPT

## 2018-09-18 PROCEDURE — 85730 THROMBOPLASTIN TIME PARTIAL: CPT

## 2018-09-18 PROCEDURE — 96365 THER/PROPH/DIAG IV INF INIT: CPT | Mod: XU

## 2018-09-18 PROCEDURE — 87086 URINE CULTURE/COLONY COUNT: CPT

## 2018-09-18 PROCEDURE — 80053 COMPREHEN METABOLIC PANEL: CPT

## 2018-09-18 PROCEDURE — 74183 MRI ABD W/O CNTR FLWD CNTR: CPT

## 2018-09-18 PROCEDURE — 74183 MRI ABD W/O CNTR FLWD CNTR: CPT | Mod: 26

## 2018-09-18 PROCEDURE — 86900 BLOOD TYPING SEROLOGIC ABO: CPT

## 2018-09-18 PROCEDURE — 84133 ASSAY OF URINE POTASSIUM: CPT

## 2018-09-18 PROCEDURE — 83935 ASSAY OF URINE OSMOLALITY: CPT

## 2018-09-18 PROCEDURE — 82436 ASSAY OF URINE CHLORIDE: CPT

## 2018-09-18 PROCEDURE — 86901 BLOOD TYPING SEROLOGIC RH(D): CPT

## 2018-09-18 RX ORDER — METRONIDAZOLE 500 MG
1 TABLET ORAL
Qty: 21 | Refills: 0 | OUTPATIENT
Start: 2018-09-18 | End: 2018-09-24

## 2018-09-18 RX ORDER — TEMAZEPAM 15 MG/1
1 CAPSULE ORAL
Qty: 0 | Refills: 0 | COMMUNITY
Start: 2018-09-18

## 2018-09-18 RX ORDER — SODIUM CHLORIDE 9 MG/ML
1000 INJECTION, SOLUTION INTRAVENOUS
Qty: 0 | Refills: 0 | Status: DISCONTINUED | OUTPATIENT
Start: 2018-09-18 | End: 2018-09-18

## 2018-09-18 RX ORDER — MOXIFLOXACIN HYDROCHLORIDE TABLETS, 400 MG 400 MG/1
1 TABLET, FILM COATED ORAL
Qty: 14 | Refills: 0 | OUTPATIENT
Start: 2018-09-18 | End: 2018-09-24

## 2018-09-18 RX ORDER — TEMAZEPAM 15 MG/1
1 CAPSULE ORAL
Qty: 0 | Refills: 0 | COMMUNITY

## 2018-09-18 RX ADMIN — SODIUM CHLORIDE 100 MILLILITER(S): 9 INJECTION INTRAMUSCULAR; INTRAVENOUS; SUBCUTANEOUS at 06:18

## 2018-09-18 RX ADMIN — HEPARIN SODIUM 5000 UNIT(S): 5000 INJECTION INTRAVENOUS; SUBCUTANEOUS at 22:41

## 2018-09-18 RX ADMIN — SODIUM CHLORIDE 100 MILLILITER(S): 9 INJECTION, SOLUTION INTRAVENOUS at 18:07

## 2018-09-18 RX ADMIN — HEPARIN SODIUM 5000 UNIT(S): 5000 INJECTION INTRAVENOUS; SUBCUTANEOUS at 06:18

## 2018-09-18 RX ADMIN — ATORVASTATIN CALCIUM 80 MILLIGRAM(S): 80 TABLET, FILM COATED ORAL at 22:41

## 2018-09-18 NOTE — PROGRESS NOTE ADULT - PROBLEM SELECTOR PLAN 1
-patient had self reported fever of 101 F.  -has hx of sepsis, last year enterococcus in cx but unable to find source  -post vanc 1,000 mg/zosyn 3.375 mg in ED, continue for now  -SIRS +1/4 agents only (fever 101)  -f/u blood, u/a, and urine culture. RVP negative  -initial lactate of 2.6, repeat at 4 pm post 2.5 L NS. possibly 2/2 dehydration  -normal wbc  -will hold off on abx for now. If pt spikes fever, restart vanc/zosyn -patient had self reported fever of 101 F.  Has a hx of mulktiple episodes of sepsis 2/2 cholangitis in the past, currently denies jaundice, RUQ pain, or documented fevers or hypotension since arrival to ED.  Recent cx in 2017 with enterococcus.  Denies any other focal complaints.  UA, CXR, RVP unremarkable.  WBC wnl but with left shift.  Lactate 2.6 on admission improved to .9 after fluids.  Vitals significant only for tachycardia to 98, improved after fluids.  Patient took 1000 mg cipro and tylenol prior to arrival and received Vanc and Zosyn in ED.  Also received 2.5L NS in ED.  Given negative workup so far, will hold ABx for now.  If patient spikes fever, can consider restarting Vanc and Zosyn.

## 2018-09-18 NOTE — DISCHARGE NOTE ADULT - MEDICATION SUMMARY - MEDICATIONS TO TAKE
I will START or STAY ON the medications listed below when I get home from the hospital:    Flagyl 500 mg oral tablet  -- 1 tab(s) by mouth every 8 hours   -- Do not drink alcoholic beverages when taking this medication.  Finish all this medication unless otherwise directed by prescriber.  May discolor urine or feces.    -- Indication: For Primary sclerosing cholangitis    Crestor 40 mg oral tablet  -- 1 tab(s) by mouth once a day (at bedtime)  -- Indication: For Mixed hyperlipidemia    lisinopril-hydroCHLOROthiazide 10 mg-12.5 mg oral tablet  -- 1 tab(s) by mouth once a day  -- Indication: For Essential hypertension    temazepam 30 mg oral capsule  -- 1 cap(s) by mouth once a day (at bedtime)  -- Indication: For Insomnia    Cipro 500 mg oral tablet  -- 1 tab(s) by mouth 2 times a day   -- Avoid prolonged or excessive exposure to direct and/or artificial sunlight while taking this medication.  Check with your doctor before becoming pregnant.  Do not take dairy products, antacids, or iron preparations within one hour of this medication.  Finish all this medication unless otherwise directed by prescriber.  Medication should be taken with plenty of water.    -- Indication: For Primary sclerosing cholangitis

## 2018-09-18 NOTE — DISCHARGE NOTE ADULT - HOSPITAL COURSE
Patient is a 69 y/o m w/ pmhx of PSC (s/p 3 ERCP w/ dilatation), cholecystectomy, 3 episodes of sepsis (last blood cx in 2017 and ICU admission w/ enterococcus), htn, and hld who presented to the ED due to a fever at home of 101. His ID physician told him to take 2 extra strength tylenol, and 2 ciprofloxacin (500 mg each) when this happens and go to the emergency room. He states the fever has only been present at 11 am today. He lives in [place] and is traveling here. His partner noted a dry cough for the past 2 days, but he has not. He denies nausea, vomiting, diarrhea, sob, cp, dysuria, or lightheadedness. He has had a colonoscopy/endoscopy in the past few years and was told those were unremarkable. Denies sick contacts. ED: vitals 99.8 oral (after motrin 400 mg it was 98.6), 98 heart rate (80 after fluids), 118/67 bp, and 18 breathes per minute with 98% saturation room air. He received motrin 400 mg, vancomycin 1,000 mg, and zosyn 3.375 mg. 2.5 L NS.    GI consulted - concern for cholangitis in setting of fever, though remained afebrile after initial episode. Had MRCP - prelim read over the phone did not suggest stricturing disease, and pt was planned for ERCP on 9/19/18 in AM with cholangiography. However, once pt heard that his MRCP was negative on the prelim, he wanted to leave and demonstrated capacity to make own decision. He prefers to get ERCP in [place], and agreed to a 7 day course of ciprofloxacin and metronidazole in the interim, both sent to his pharmacy - [facility] (which he will  in the morning). He understands the risks of leaving AMA (he signed AMA form), including infection, clinical deterioration, or even death. Discussed with overnight hospitalist attending.

## 2018-09-18 NOTE — PROGRESS NOTE ADULT - PROBLEM SELECTOR PLAN 3
-likely 2/2 dehydration  -Patient had Creatinine of 1.31, baseline is 1.07.  -does not have history of kidney disease  -urine lytes  -will follow BMP in AM after 2.5 L fluids given in ED today. Resolved  -likely 2/2 dehydration  -Patient had Creatinine of 1.31, baseline is 1.07; improved to 1.1 after IV.  -does not have history of kidney disease  Recheck BMP as needed.

## 2018-09-18 NOTE — DISCHARGE NOTE ADULT - PATIENT PORTAL LINK FT
You can access the [facility] Portal, offered by [facility], by registering with the following website: [email or web address]

## 2018-09-18 NOTE — PROGRESS NOTE ADULT - SUBJECTIVE AND OBJECTIVE BOX
NOTE IS IN PROGRESS  OVERNIGHT EVENTS:    SUBJECTIVE / INTERVAL HPI: Patient seen and examined at bedside.     VITAL SIGNS:  Vital Signs Last 24 Hrs  T(C): 37.1 (18 Sep 2018 09:18), Max: 37.1 (18 Sep 2018 09:18)  T(F): 98.8 (18 Sep 2018 09:18), Max: 98.8 (18 Sep 2018 09:18)  HR: 63 (18 Sep 2018 09:18) (59 - 80)  BP: 133/89 (18 Sep 2018 09:18) (100/63 - 140/82)  BP(mean): --  RR: 18 (18 Sep 2018 09:18) (16 - 18)  SpO2: 98% (18 Sep 2018 09:18) (95% - 100%)    PHYSICAL EXAM:    General: WDWN  HEENT: NC/AT; PERRL, anicteric sclera; MMM  Neck: supple  Cardiovascular: +S1/S2; RRR  Respiratory: CTA B/L; no W/R/R  Gastrointestinal: soft, NT/ND; +BSx4  Extremities: WWP; no edema, clubbing or cyanosis  Vascular: 2+ radial, DP/PT pulses B/L  Neurological: AAOx3; no focal deficits    MEDICATIONS:  MEDICATIONS  (STANDING):  atorvastatin 80 milliGRAM(s) Oral at bedtime  heparin  Injectable 5000 Unit(s) SubCutaneous every 8 hours  sodium chloride 0.9%. 1000 milliLiter(s) (100 mL/Hr) IV Continuous <Continuous>  temazepam 30 milliGRAM(s) Oral at bedtime    MEDICATIONS  (PRN):      ALLERGIES:  Allergies    No Known Allergies    Intolerances        LABS:                        12.7   5.4   )-----------( 183      ( 18 Sep 2018 05:46 )             39.2         140  |  103  |  11  ----------------------------<  93  4.2   |  26  |  1.14    Ca    8.8      18 Sep 2018 05:46  Mg     1.9         TPro  5.8<L>  /  Alb  3.3  /  TBili  0.4  /  DBili  <0.2  /  AST  36  /  ALT  46<H>  /  AlkPhos  72      PT/INR - ( 17 Sep 2018 19:10 )   PT: 12.8 sec;   INR: 1.15          PTT - ( 17 Sep 2018 19:10 )  PTT:28.5 sec  Urinalysis Basic - ( 17 Sep 2018 18:50 )    Color: Yellow / Appearance: Clear / S.010 / pH: x  Gluc: x / Ketone: NEGATIVE  / Bili: Negative / Urobili: 0.2 E.U./dL   Blood: x / Protein: NEGATIVE mg/dL / Nitrite: NEGATIVE   Leuk Esterase: NEGATIVE / RBC: x / WBC x   Sq Epi: x / Non Sq Epi: x / Bacteria: x      CAPILLARY BLOOD GLUCOSE          RADIOLOGY & ADDITIONAL TESTS: Reviewed.    ASSESSMENT:    PLAN: OVERNIGHT EVENTS:  No significant overnight events.  The patient reports a mild headache overnight, relieved with Tylenol.  He reports feeling well currently and denies any further fevers, chills, abdominal pain, nausea, vomiting, diarrhea, urinary complaints, chest pain, or shortness of breath.    SUBJECTIVE / INTERVAL HPI: Patient seen and examined at bedside.     VITAL SIGNS:  Vital Signs Last 24 Hrs  T(C): 37.1 (18 Sep 2018 09:18), Max: 37.1 (18 Sep 2018 09:18)  T(F): 98.8 (18 Sep 2018 09:18), Max: 98.8 (18 Sep 2018 09:18)  HR: 63 (18 Sep 2018 09:18) (59 - 80)  BP: 133/89 (18 Sep 2018 09:18) (100/63 - 140/82)  BP(mean): --  RR: 18 (18 Sep 2018 09:18) (16 - 18)  SpO2: 98% (18 Sep 2018 09:18) (95% - 100%)    PHYSICAL EXAM:    General: well-appearing male in NAD, appears stated age  Derm: No jaundice  HEENT: NC/AT; PERRL, anicteric sclera; MMM  Neck: supple, no JVD, no LAD  Cardiovascular: +S1/S2; RRR  Respiratory: CTA B/L; no W/R/R, normal respiratory effort on room air  Gastrointestinal: soft, NT/ND; +BSx4, no masses appreciated with light and deep palpation.  Bush's sign negative.    Extremities: WWP; no edema, clubbing or cyanosis  Vascular: 2+ radial, DP/PT pulses B/L  Neurological: AAOx3; no focal deficits    MEDICATIONS:  MEDICATIONS  (STANDING):  atorvastatin 80 milliGRAM(s) Oral at bedtime  heparin  Injectable 5000 Unit(s) SubCutaneous every 8 hours  sodium chloride 0.9%. 1000 milliLiter(s) (100 mL/Hr) IV Continuous <Continuous>  temazepam 30 milliGRAM(s) Oral at bedtime    MEDICATIONS  (PRN):      ALLERGIES:  Allergies    No Known Allergies    Intolerances        LABS:                        12.7   5.4   )-----------( 183      ( 18 Sep 2018 05:46 )             39.2     -18    140  |  103  |  11  ----------------------------<  93  4.2   |  26  |  1.14    Ca    8.8      18 Sep 2018 05:46  Mg     1.9         TPro  5.8<L>  /  Alb  3.3  /  TBili  0.4  /  DBili  <0.2  /  AST  36  /  ALT  46<H>  /  AlkPhos  72      PT/INR - ( 17 Sep 2018 19:10 )   PT: 12.8 sec;   INR: 1.15          PTT - ( 17 Sep 2018 19:10 )  PTT:28.5 sec  Urinalysis Basic - ( 17 Sep 2018 18:50 )    Color: Yellow / Appearance: Clear / S.010 / pH: x  Gluc: x / Ketone: NEGATIVE  / Bili: Negative / Urobili: 0.2 E.U./dL   Blood: x / Protein: NEGATIVE mg/dL / Nitrite: NEGATIVE   Leuk Esterase: NEGATIVE / RBC: x / WBC x   Sq Epi: x / Non Sq Epi: x / Bacteria: x      CAPILLARY BLOOD GLUCOSE          RADIOLOGY & ADDITIONAL TESTS: Reviewed.    ASSESSMENT:    PLAN:

## 2018-09-18 NOTE — PROGRESS NOTE ADULT - ASSESSMENT
68 year old male with PSC requiring past ERCPs for stricture dilation presents to ER after episode of fever concerning for cholangitis. Pt remains afebrile with normal labs. Awaiting MRCP to evaluate for further stricturing disease.     #PSC w/ concerning for cholangitis  -BCx, UCx, CXR appreciated - NGTD  -LTs remain stable   -MRCP to r/o recurrence of stricture pending   -Can continue abx - would convert to PO abx to complete 7 day course upon d/c    GI will follow.       case d/w Dr. Campos 68 year old male with PSC requiring past ERCPs for stricture dilation presents to ER after episode of fever concerning for cholangitis. Pt remains afebrile with normal labs. Awaiting MRCP to evaluate for further stricturing disease. Will plan for ERCP with cholangiography tomorrow am.     #PSC w/ concerning for cholangitis  -BCx, UCx, CXR appreciated - NGTD  -LTs remain stable   -MRCP to r/o recurrence of stricture pending   -NPO at midnight for ERCP tomrrow.     GI will follow.       case d/w Dr. Campos 68 year old male with PSC requiring past ERCPs for stricture dilation presents to ER after episode of fever concerning for cholangitis. Pt remains afebrile with normal labs. Awaiting MRCP to evaluate for further stricturing disease. Will plan for ERCP with cholangiography tomorrow am.     #PSC w/ concerning for cholangitis  -BCx, UCx, CXR appreciated - NGTD  -LTs remain stable   -MRCP to r/o recurrence of stricture pending   -NPO at midnight for ERCP tomrrow.     GI will follow.       case d/w Dr. Campos     Addendum 9/18 12:40 PM    Discussed possible ERCP with patient his PCP Dr. Duarte ([phone]), Both patient and PCP are hesitant to perform ERCP on this admission as they are concerned that pt may become septic post procedurally. I explained that bacterial translocation is rare after ERCP instrumentation, and given his hx of PCP we would like to at the very least obtain a cholangiogram to definitively rule out stricture/ obstruction. PCP and Pt would prefer to perform ERCP with primary GI. I explained to patient that pt could become septic and that if that is the case, emergent ERCP would be necessary. The verbalized understanding but would prefer to avoid procedure at this time.    Plan:    -MRCP  -If stable, can d/c to complete 7 day course of abx   -follow up with GI at [facility]   -If any further evidence of fever/chills instructed pt to return to [facility] ER for ERCP.

## 2018-09-18 NOTE — PROGRESS NOTE ADULT - PROBLEM SELECTOR PLAN 4
-Na of 132.  -patient states decreased oral intake.  -will follow urine lytes. Resolved.  Liekly hypovolemic hyponatremia due to dehydration as pt reports decreased PO fluid intake.  Improved and WNL on IVF.

## 2018-09-18 NOTE — PROGRESS NOTE ADULT - PROBLEM SELECTOR PLAN 5
-takes crestor 40 mg at bedtime  -atorvastatin 80 mg at bedtime therapeutic exchange  -can continue with it.

## 2018-09-18 NOTE — PROGRESS NOTE ADULT - SUBJECTIVE AND OBJECTIVE BOX
Pt seen and examined at bedside this am. No acute overnight events. Pt denies fevers/chills. Abd pain improved.     REVIEW OF SYSTEMS:  Constitutional: No fever, weight loss or fatigue  Cardiovascular: No chest pain, palpitations, dizziness or leg swelling  Gastrointestinal: No abdominal or epigastric pain. No nausea, vomiting or hematemesis; No diarrhea or constipation. No melena or hematochezia.  Skin: No itching, burning, rashes or lesions       MEDICATIONS:  MEDICATIONS  (STANDING):  atorvastatin 80 milliGRAM(s) Oral at bedtime  heparin  Injectable 5000 Unit(s) SubCutaneous every 8 hours  sodium chloride 0.9%. 1000 milliLiter(s) (100 mL/Hr) IV Continuous <Continuous>  temazepam 30 milliGRAM(s) Oral at bedtime    MEDICATIONS  (PRN):      Allergies    No Known Allergies    Intolerances        Vital Signs Last 24 Hrs  T(C): 36.8 (18 Sep 2018 05:36), Max: 37.7 (17 Sep 2018 11:56)  T(F): 98.3 (18 Sep 2018 05:36), Max: 99.8 (17 Sep 2018 11:56)  HR: 74 (18 Sep 2018 05:36) (59 - 98)  BP: 118/76 (18 Sep 2018 05:36) (100/63 - 140/82)  BP(mean): --  RR: 18 (18 Sep 2018 05:36) (16 - 18)  SpO2: 98% (18 Sep 2018 05:36) (95% - 100%)    17 @ 07:01  -   @ 07:00  --------------------------------------------------------  IN: 1000 mL / OUT: 350 mL / NET: 650 mL        PHYSICAL EXAM:    General: Well developed; well nourished; in no acute distress  HEENT: MMM, conjunctiva and sclera clear  Gastrointestinal: Soft non-tender non-distended; Normal bowel sounds    LABS:      CBC Full  -  ( 18 Sep 2018 05:46 )  WBC Count : 5.4 K/uL  Hemoglobin : 12.7 g/dL  Hematocrit : 39.2 %  Platelet Count - Automated : 183 K/uL  Mean Cell Volume : 90.3 fL  Mean Cell Hemoglobin : 29.3 pg  Mean Cell Hemoglobin Concentration : 32.4 g/dL  Auto Neutrophil # : x  Auto Lymphocyte # : x  Auto Monocyte # : x  Auto Eosinophil # : x  Auto Basophil # : x  Auto Neutrophil % : 59.3 %  Auto Lymphocyte % : 19.4 %  Auto Monocyte % : 20.4 %  Auto Eosinophil % : 0.7 %  Auto Basophil % : 0.2 %        140  |  103  |  11  ----------------------------<  93  4.2   |  26  |  1.14    Ca    8.8      18 Sep 2018 05:46  Mg     1.9         TPro  5.8<L>  /  Alb  3.3  /  TBili  0.4  /  DBili  <0.2  /  AST  36  /  ALT  46<H>  /  AlkPhos  72      PT/INR - ( 17 Sep 2018 19:10 )   PT: 12.8 sec;   INR: 1.15          PTT - ( 17 Sep 2018 19:10 )  PTT:28.5 sec      Urinalysis Basic - ( 17 Sep 2018 18:50 )    Color: Yellow / Appearance: Clear / S.010 / pH: x  Gluc: x / Ketone: NEGATIVE  / Bili: Negative / Urobili: 0.2 E.U./dL   Blood: x / Protein: NEGATIVE mg/dL / Nitrite: NEGATIVE   Leuk Esterase: NEGATIVE / RBC: x / WBC x   Sq Epi: x / Non Sq Epi: x / Bacteria: x                RADIOLOGY & ADDITIONAL STUDIES (The following images were personally reviewed):

## 2018-09-18 NOTE — DISCHARGE NOTE ADULT - PROVIDER TOKENS
FREE:[LAST:[Gerald],FIRST:[Lauri CHE)],PHONE:[phone]],FAX:[(   [phone]    -],ADDRESS:[[address] [place] [id number]]]

## 2018-09-18 NOTE — DISCHARGE NOTE ADULT - CARE PLAN
Principal Discharge DX:	Primary sclerosing cholangitis  Goal:	Discharge home - to hotel. Signed AMA form - left AMA without wanting to wait for discharge paperwork.  Assessment and plan of treatment:	Discharged on 7 days cipro/flagyl - we will call him with final MRCP result. He prefers ERCP in [place].  Secondary Diagnosis:	Fever  Assessment and plan of treatment:	Follow up with primary doctor.  Secondary Diagnosis:	SEAN (acute kidney injury)  Assessment and plan of treatment:	Follow up with primary doctor.  Secondary Diagnosis:	Essential hypertension  Assessment and plan of treatment:	Follow up with primary doctor.  Secondary Diagnosis:	Mixed hyperlipidemia  Assessment and plan of treatment:	Follow up with primary doctor. Principal Discharge DX:	Primary sclerosing cholangitis  Goal:	Discharge home - to hotel. Signed AMA form - left AMA without wanting to wait for discharge paperwork.  Assessment and plan of treatment:	Discharged on 7 days cipro/flagyl - we will call him with final MRCP result. He prefers ERCP in [place].  Secondary Diagnosis:	Fever  Assessment and plan of treatment:	Follow up with primary doctor.  Secondary Diagnosis:	SEAN (acute kidney injury)  Assessment and plan of treatment:	Resolved. Follow up with primary doctor.  Secondary Diagnosis:	Essential hypertension  Assessment and plan of treatment:	Follow up with primary doctor.  Secondary Diagnosis:	Mixed hyperlipidemia  Assessment and plan of treatment:	Follow up with primary doctor.

## 2018-09-18 NOTE — PROGRESS NOTE ADULT - PROBLEM SELECTOR PLAN 2
-patient has hx w/ ERCP post dilatation.  -will keep patient NPO for MRCP, if areas show stenosis, will take patient to ERCP  -bilirubin 0.6 AST ALT mildly elevated.  -appreciate GI recommendations Patient has had multiple episodes of sepsis secondary to cholagntitis in the past with most recent culture in 2017 with enterococcus.  Patient states that he has developed infections post-ERCP multiple times.  Pt has been afebrile since admission without jaundice or RUQ tenderness on exam.  Bush's sign negative.  Bilirubin and ALK Phos WNL.  AST and ALT slightly elevated on admission, but improved after IV fluids.  Case discussed with GI, who recommends MRCP today for further evaluation.  GI also recommending elective ERCP; however, pt conncered because he prefers to follow up with his outpatient GI physician Dr. Chairez ([phone]) in [place].  Plan to procedd with MRCP today per GI; if negative, will likely discharge with outpatient GI follow up.  -patient has hx w/ ERCP post dilatation.  -will keep patient NPO for MRCP, if areas show stenosis, will take patient to ERCP  -bilirubin 0.6 AST ALT mildly elevated.  -appreciate GI recommendations

## 2018-09-18 NOTE — PROGRESS NOTE ADULT - ASSESSMENT
Patient is a 69 y/o m w/ pmhx of PSC (s/p 3 ERCP w/ dilatation), cholecystectomy, 3 episodes of sepsis (last blood cx in 2017 and ICU admission w/ enterococcus), htn, and hld who presented to the ED due to a fever at home of 101. 67 yo M PMHx of PSC (s/p ERCP with dilatation X3), HTN, HLD, and 3 prior episodes of sepsis 2/2 cholangitis (last episode in 2017 which required ICU admission, blood cultures with enterococcus) who presented initially for fever.  He is admitted for further workup of FUO given his complicated hx of multiple episodes of sepsis 2/2 cholangitis.

## 2018-09-18 NOTE — DISCHARGE NOTE ADULT - PLAN OF CARE
Discharge home - to hotel. Signed AMA form - left AMA without wanting to wait for discharge paperwork. Discharged on 7 days cipro/flagyl - we will call him with final MRCP result. He prefers ERCP in [place]. Follow up with primary doctor. Resolved. Follow up with primary doctor.

## 2018-09-18 NOTE — PROGRESS NOTE ADULT - ATTENDING COMMENTS
Pt seen and examined by me at bedside earlier in AM. Agree with housestaff's exam/a/p as noted above with additions,   pt and partner at bedside, updated pt on currently clinical status and GI recs.  Pt adamantly refusing ERCP, would prefer to go back to him own outpatient GI.   Bcx-NGTD; ucx-NGTD  Fever-unclear source, pending MRCP, spoke to GI, if negative MRCP, will dc with po cipro/flagyl for 7days.

## 2018-09-18 NOTE — PROGRESS NOTE ADULT - PROBLEM SELECTOR PLAN 6
-patient on lisinopril/hct 10-12.5 mg at bedtime, can hold for now.  -/82, can hold for now due to SEAN. -patient on lisinopril/hct 10-12.5 mg at bedtime, can hold for now.  -/82, can hold for now due to SEAN.  - Will resume if BP WNL in afternoon.

## 2018-09-19 LAB
CULTURE RESULTS: NO GROWTH — SIGNIFICANT CHANGE UP
SPECIMEN SOURCE: SIGNIFICANT CHANGE UP

## 2018-09-22 LAB
CULTURE RESULTS: SIGNIFICANT CHANGE UP
CULTURE RESULTS: SIGNIFICANT CHANGE UP
SPECIMEN SOURCE: SIGNIFICANT CHANGE UP
SPECIMEN SOURCE: SIGNIFICANT CHANGE UP

## 2018-09-24 DIAGNOSIS — K83.0 CHOLANGITIS: ICD-10-CM

## 2018-09-24 DIAGNOSIS — I10 ESSENTIAL (PRIMARY) HYPERTENSION: ICD-10-CM

## 2018-09-24 DIAGNOSIS — Z90.49 ACQUIRED ABSENCE OF OTHER SPECIFIED PARTS OF DIGESTIVE TRACT: ICD-10-CM

## 2018-09-24 DIAGNOSIS — E86.1 HYPOVOLEMIA: ICD-10-CM

## 2018-09-24 DIAGNOSIS — E87.1 HYPO-OSMOLALITY AND HYPONATREMIA: ICD-10-CM

## 2018-09-24 DIAGNOSIS — E78.2 MIXED HYPERLIPIDEMIA: ICD-10-CM

## 2018-09-24 DIAGNOSIS — Z53.29 PROCEDURE AND TREATMENT NOT CARRIED OUT BECAUSE OF PATIENT'S DECISION FOR OTHER REASONS: ICD-10-CM

## 2018-09-24 DIAGNOSIS — R50.9 FEVER, UNSPECIFIED: ICD-10-CM

## 2018-09-24 DIAGNOSIS — E86.0 DEHYDRATION: ICD-10-CM

## 2020-08-05 NOTE — PATIENT PROFILE ADULT. - REASON FOR REFUSAL (REFER PATIENT TO HEALTHCARE PROVIDER FOR FOLLOW-UP):
Implemented All Universal Safety Interventions:  [place] to call system. Call bell, personal items and telephone within reach. Instruct patient to call for assistance. Room bathroom lighting operational. Non-slip footwear when patient is off stretcher. Physically safe environment: no spills, clutter or unnecessary equipment. Stretcher in lowest position, wheels locked, appropriate side rails in place. Patient had fever today

## 2022-07-19 NOTE — PROGRESS NOTE ADULT - PROBLEM SELECTOR PLAN 7
1) PCP Contacted on Admission: (Y/N) --> Name & Phone #: Dr. Poncho Da Silva was contacted. Baseline creatinine 1.07 but now 1.31. Physician agrees with current management.  2) Date of Contact with PCP:  3) PCP Contacted at Discharge: (Y/N, N/A)  4) Summary of Handoff Given to PCP:   5) Post-Discharge Appointment Date and Location:    [facility]  Diet: NPO due to procedure, will change to low salt DASH diet. 100 cc/hr maintenance fluids for 10 hours.  Electrolytes: K>4 Mag >2  Prophylaxis: Heparin subQ 5,000 Q8 hours.  code status: FULL CODE
None

## 2022-07-28 NOTE — ED ADULT NURSE NOTE - NS ED NURSE LEVEL OF CONSCIOUSNESS ORIENTATION
Patient is requesting Trelegy prescription for a 12 month  instead of 4 month supply. Pt only has 10 days left if is requesting refill soon.     Ruoting refill request to refill pool for review.    Oriented - self; Oriented - place; Oriented - time